# Patient Record
Sex: MALE | Race: BLACK OR AFRICAN AMERICAN | NOT HISPANIC OR LATINO | Employment: FULL TIME | URBAN - METROPOLITAN AREA
[De-identification: names, ages, dates, MRNs, and addresses within clinical notes are randomized per-mention and may not be internally consistent; named-entity substitution may affect disease eponyms.]

---

## 2017-01-16 ENCOUNTER — OFFICE VISIT (OUTPATIENT)
Dept: OTOLARYNGOLOGY | Facility: CLINIC | Age: 27
End: 2017-01-16
Payer: COMMERCIAL

## 2017-01-16 VITALS
DIASTOLIC BLOOD PRESSURE: 80 MMHG | HEART RATE: 65 BPM | WEIGHT: 166.44 LBS | BODY MASS INDEX: 24.58 KG/M2 | TEMPERATURE: 98 F | SYSTOLIC BLOOD PRESSURE: 123 MMHG

## 2017-01-16 DIAGNOSIS — H60.42 CHOLESTEATOMA OF LEFT EXTERNAL AUDITORY CANAL: Primary | ICD-10-CM

## 2017-01-16 PROCEDURE — 1159F MED LIST DOCD IN RCRD: CPT | Mod: S$GLB,,, | Performed by: ORTHOPAEDIC SURGERY

## 2017-01-16 PROCEDURE — 99999 PR PBB SHADOW E&M-NEW PATIENT-LVL III: CPT | Mod: PBBFAC,,, | Performed by: ORTHOPAEDIC SURGERY

## 2017-01-16 PROCEDURE — 99203 OFFICE O/P NEW LOW 30 MIN: CPT | Mod: S$GLB,,, | Performed by: ORTHOPAEDIC SURGERY

## 2017-01-16 RX ORDER — CIPROFLOXACIN AND DEXAMETHASONE 3; 1 MG/ML; MG/ML
4 SUSPENSION/ DROPS AURICULAR (OTIC) 2 TIMES DAILY
Qty: 7.5 ML | Refills: 0 | Status: SHIPPED | OUTPATIENT
Start: 2017-01-16 | End: 2017-01-26

## 2017-01-16 NOTE — MR AVS SNAPSHOT
[facility]  [address] [facility] [address]  Phone: [phone]  Fax: [phone]                  Holger Martin   2017 3:45 PM   Office Visit    Description:  Male : 1990   Provider:  Rach Rain MD   Department:  [facility]           Reason for Visit     Cerumen Impaction           Diagnoses this Visit        Comments    Cholesteatoma of left external auditory canal    -  Primary            To Do List           Future Appointments        Provider Department Dept Phone    2017 10:30 AM AUDIOGRAM, AUDIO [facility] Audiology [phone]    2017 11:15 AM Armando Butler MD [facility] Otorhinolaryngology [phone]      Goals (5 Years of Data)     None      Follow-Up and Disposition     Follow-up and Disposition History       These Medications        Disp Refills Start End    ciprofloxacin-dexamethasone 0.3-0.1% (CIPRODEX) 0.3-0.1 % DrpS 7.5 mL 0 2017    Place 4 drops into both ears 2 (two) times daily. - Both Ears    Pharmacy: [address] Ph #: [phone]         [facility]     [facility] On Call Nurse Care Line -  Assistance  Registered nurses in the [facility] provide clinical advisement, health education, appointment booking, and other advisory services.  Call for this free service at [phone].             Medications           Message regarding Medications     Verify the changes and/or additions to your medication regime listed below are the same as discussed with your clinician today.  If any of these changes or additions are incorrect, please notify your healthcare provider.        START taking these NEW medications        Refills    ciprofloxacin-dexamethasone 0.3-0.1% (CIPRODEX) 0.3-0.1 % DrpS 0    Sig: Place 4 drops into both ears 2 (two) times daily.    Class: Normal    Route: Both Ears      STOP taking these medications      oxycodone-acetaminophen (PERCOCET) 5-325 mg per tablet            Verify that the below list of medications is an accurate representation of the medications you are currently taking.  If none reported, the list may be blank. If incorrect, please contact your healthcare provider. Carry this list with you in case of emergency.           Current Medications     ciprofloxacin-dexamethasone 0.3-0.1% (CIPRODEX) 0.3-0.1 % DrpS Place 4 drops into both ears 2 (two) times daily.           Clinical Reference Information           Vital Signs - Last Recorded  Most recent update: 1/16/2017  4:06 PM by Noa Saunders LPN    BP Pulse Temp Wt BMI    123/80 (BP Location: Left arm, Patient Position: Sitting, BP Method: Automatic) 65 98.4 °F (36.9 °C) (Tympanic) 75.5 kg (166 lb 7.2 oz) 24.58 kg/m2      Blood Pressure          Most Recent Value    BP  123/80      Allergies as of 1/16/2017     No Known Allergies      Immunizations Administered on Date of Encounter - 1/16/2017     None      Orders Placed During Today's Visit      Normal Orders This Visit    Ambulatory consult to ENT       [facility] Sign-Up     Activating your [facility] account is as easy as 1-2-3!     1) Visit [email or web address], select Sign Up Now, enter this activation code and your date of birth, then select Next.  [email or web address]  Expires: 3/2/2017  4:42 PM      2) Create a username and password to use when you visit [facility] in the future and select a security question in case you lose your password and select Next.    3) Enter your e-mail address and click Sign Up!    Additional Information  If you have questions, please e-mail [email or web address] or call [phone] to talk to our [facility] staff. Remember, [facility] is NOT to be used for urgent needs. For medical emergencies, dial 911.         Smoking Cessation     If you would like to quit smoking:   You may be eligible for free services if you are a [place] resident and started smoking cigarettes  before September 1, 1988.  Call the Smoking Cessation Trust (SCT) toll free at (773) 977-1134 or (535) 346-7399.   Call 4-654-QUIT-NOW if you do not meet the above criteria.

## 2017-01-16 NOTE — PROGRESS NOTES
Subjective:       Patient ID: Holger Martin is a 26 y.o. male.    Chief Complaint: Cerumen Impaction (left )    HPI Comments: Patient is a very pleasant 26 year old gentleman here to see me today in followup for evaluation of his left ear.  He has a history of two previous surgeries with Dr. Butler for cholesteatoma in the left ear, most recently in 2012.  He thinks he may have some hearing loss in the left ear, and notes that his headphones are not as loud in that ear.  He has not noted any ear drainage, but says that his ear is wet.  He denies any tinnitus or dizziness.  He has had some mild ear pain over the last month.    Review of Systems   Constitutional: Negative for fatigue, fever and unexpected weight change.   HENT: Positive for ear pain and hearing loss. Negative for congestion, ear discharge, facial swelling, nosebleeds, postnasal drip, rhinorrhea, sinus pressure, sneezing, sore throat, tinnitus, trouble swallowing and voice change.    Eyes: Negative for discharge, redness and itching.   Respiratory: Negative for cough, choking, shortness of breath and wheezing.    Cardiovascular: Negative for chest pain and palpitations.   Gastrointestinal: Negative for abdominal pain.        No reflux.   Musculoskeletal: Negative for neck pain.   Neurological: Negative for dizziness, facial asymmetry, light-headedness and headaches.   Hematological: Negative for adenopathy. Does not bruise/bleed easily.   Psychiatric/Behavioral: Negative for agitation, behavioral problems, confusion and decreased concentration.       Objective:      Physical Exam   Constitutional: He is oriented to person, place, and time. Vital signs are normal. He appears well-developed and well-nourished. No distress.   HENT:   Head: Normocephalic and atraumatic.   Right Ear: Hearing, tympanic membrane, external ear and ear canal normal.   Left Ear: Tympanic membrane, external ear and ear canal normal. Decreased hearing is noted.   Nose: Nose  normal. No mucosal edema, rhinorrhea, nasal deformity or septal deviation.   Mouth/Throat: Uvula is midline, oropharynx is clear and moist and mucous membranes are normal. No trismus in the jaw. Normal dentition. No uvula swelling. No oropharyngeal exudate or posterior oropharyngeal edema.   Mixture of cerumen and keratin debris again filling the left ear canal, tried to remove but the keratin debris is adherent to the canal wall again raising concern for cholesteatoma   Eyes: Conjunctivae and EOM are normal. Pupils are equal, round, and reactive to light. Right eye exhibits no chemosis. Left eye exhibits no chemosis. Right conjunctiva is not injected. Left conjunctiva is not injected. No scleral icterus.   Neck: Trachea normal and phonation normal. No tracheal tenderness present. No tracheal deviation present. No thyroid mass and no thyromegaly present.   Cardiovascular: Intact distal pulses.    Pulmonary/Chest: Effort normal. No accessory muscle usage or stridor. No respiratory distress.   Lymphadenopathy:        Head (right side): No submental, no submandibular, no preauricular and no posterior auricular adenopathy present.        Head (left side): No submental, no submandibular, no preauricular and no posterior auricular adenopathy present.     He has no cervical adenopathy.        Right cervical: No superficial cervical and no deep cervical adenopathy present.       Left cervical: No superficial cervical and no deep cervical adenopathy present.   Neurological: He is alert and oriented to person, place, and time. No cranial nerve deficit.   Skin: Skin is warm and dry. No rash noted. No erythema.   Psychiatric: He has a normal mood and affect. His behavior is normal. Thought content normal.       Assessment:       1. Cholesteatoma of left external auditory canal        Plan:       1. Cholesteatoma of left ear canal:  He has a history of a left EAC cholesteatoma that has previously been treated by Dr. Butler, and  I am concerned that it has returned.  He has not been seen in nearly five years, and we did discuss the importance of routine followup.  Will start on Ciprodex, and schedule him to see Dr. Mcclain in the near future.  Will defer audiology until after left ear is cleared of cerumen/possible cholesteatoma.

## 2017-02-17 ENCOUNTER — OFFICE VISIT (OUTPATIENT)
Dept: OTOLARYNGOLOGY | Facility: CLINIC | Age: 27
End: 2017-02-17
Payer: COMMERCIAL

## 2017-02-17 ENCOUNTER — TELEPHONE (OUTPATIENT)
Dept: OTOLARYNGOLOGY | Facility: CLINIC | Age: 27
End: 2017-02-17

## 2017-02-17 ENCOUNTER — CLINICAL SUPPORT (OUTPATIENT)
Dept: AUDIOLOGY | Facility: CLINIC | Age: 27
End: 2017-02-17
Payer: COMMERCIAL

## 2017-02-17 VITALS — WEIGHT: 167.56 LBS | BODY MASS INDEX: 24.82 KG/M2 | HEIGHT: 69 IN

## 2017-02-17 DIAGNOSIS — H71.92 CHOLESTEATOMA OF LEFT EAR: Primary | ICD-10-CM

## 2017-02-17 DIAGNOSIS — H90.12 CONDUCTIVE HEARING LOSS OF LEFT EAR WITH UNRESTRICTED HEARING OF CONTRALATERAL EAR: Primary | ICD-10-CM

## 2017-02-17 PROCEDURE — 99213 OFFICE O/P EST LOW 20 MIN: CPT | Mod: 25,S$GLB,, | Performed by: OTOLARYNGOLOGY

## 2017-02-17 PROCEDURE — 92557 COMPREHENSIVE HEARING TEST: CPT | Mod: S$GLB,,, | Performed by: AUDIOLOGIST

## 2017-02-17 PROCEDURE — 69220 CLEAN OUT MASTOID CAVITY: CPT | Mod: S$GLB,,, | Performed by: OTOLARYNGOLOGY

## 2017-02-17 PROCEDURE — 99999 PR PBB SHADOW E&M-EST. PATIENT-LVL I: CPT | Mod: PBBFAC,,,

## 2017-02-17 PROCEDURE — 99999 PR PBB SHADOW E&M-EST. PATIENT-LVL III: CPT | Mod: PBBFAC,,, | Performed by: OTOLARYNGOLOGY

## 2017-02-17 RX ORDER — CIPROFLOXACIN AND DEXAMETHASONE 3; 1 MG/ML; MG/ML
4 SUSPENSION/ DROPS AURICULAR (OTIC) 2 TIMES DAILY
Status: ON HOLD | COMMUNITY
End: 2017-03-07 | Stop reason: HOSPADM

## 2017-02-17 NOTE — PROGRESS NOTES
Subjective:       Patient ID: Holger Martin is a 26 y.o. male.    Chief Complaint: Cholesteatoma    HPI: Ref Dr Rain.    Hx of L EAC Chol.    Done 2012.    Lost to F/U.    Now with recurrence.    Past Medical History: Patient has a past medical history of Cholesteatoma and Hearing loss.    Past Surgical History: Patient has a past surgical history that includes Cholesteatoma excision.    Social History: Patient reports that he has been smoking.  He does not have any smokeless tobacco history on file. He reports that he does not drink alcohol or use illicit drugs.    Family History: family history includes Diabetes in his father; Hypertension in his father.    Medications:   Current Outpatient Prescriptions   Medication Sig    ciprofloxacin-dexamethasone 0.3-0.1% (CIPRODEX) 0.3-0.1 % DrpS 4 drops 2 (two) times daily.     No current facility-administered medications for this visit.        Allergies: Patient has No Known Allergies.              Review of Systems   Constitutional: Negative.    HENT: Positive for ear discharge, ear pain and hearing loss. Negative for tinnitus.    Neurological: Negative for dizziness, seizures, syncope, facial asymmetry, speech difficulty, weakness, light-headedness and headaches.       Objective:      Physical Exam   Constitutional: He appears well-developed and well-nourished. No distress.   HENT:   Head: Normocephalic and atraumatic.   Right Ear: No lacerations. No drainage, swelling or tenderness. No foreign bodies. No mastoid tenderness. Tympanic membrane is not injected, not scarred, not perforated, not erythematous, not retracted and not bulging. Tympanic membrane mobility is normal. No middle ear effusion. No hemotympanum. No decreased hearing is noted.   Left Ear: No lacerations. There is drainage. No swelling or tenderness. No foreign bodies. No mastoid tenderness. Tympanic membrane is not injected, not scarred, not perforated, not erythematous, not retracted and not  bulging. Tympanic membrane mobility is normal.  No middle ear effusion. No hemotympanum. Decreased hearing is noted.   Ears:    Mouth/Throat: Oropharynx is clear and moist. No oropharyngeal exudate.       Procedure note:    The patient was brought to the minor procedure room and placed in the supine position. The video microscope was brought onto the field. The ear canal, tympanic membrane and other structures were visualized and demonstrated to the patient and family. The patient tolerated the procedure well and there were no complications.    The patient was brought to the minor procedure room and the left cavity was cleaned of squamous and cerumenous debris with micro dissection techniques using the operating microscope. No evidence of recurrent or residual cholesteatoma. Patient tolerated procedure well.    EAC filled with squamous debris.Penetrates to ME over [place].       Eyes: Pupils are equal, round, and reactive to light. Right eye exhibits normal extraocular motion and no nystagmus. Left eye exhibits normal extraocular motion and no nystagmus.   Neck: Normal range of motion.   Neurological: He is alert. He has normal strength. He displays no atrophy and no tremor. No cranial nerve deficit or sensory deficit. He exhibits normal muscle tone. He displays a negative Romberg sign. He displays no seizure activity. Coordination and gait normal.   Skin: He is not diaphoretic.               Assessment:       1. Cholesteatoma of left ear        Plan:         Discussed Left Tympanomastoidectomy with patient. Intact canal wall vs. Canal wall down depending on the findings at surgery. Discussed possibility of long term tube placement, need for cavity, meatoplasty, cavity care, long term follow up, need for secondary procedures. Risks, complications, alternatives and goals reviewed including possible infection, bleeding, hearing loss, chronic drainage, facial nerve problems, dural injury and other potential problems.      Holger L was seen today for cholesteatoma.    Diagnoses and all orders for this visit:    Cholesteatoma of left ear  -     Ambulatory referral to Audiology  -     CT Temporal Bone without contrast; Future

## 2017-02-17 NOTE — PROGRESS NOTES
Subjective:       Patient ID: Holger Martin is a 26 y.o. male.    Chief Complaint: Cholesteatoma    HPI  Review of Systems    Objective:      Physical Exam    Assessment:       1. Cholesteatoma of left ear        Plan:

## 2017-02-24 ENCOUNTER — HOSPITAL ENCOUNTER (OUTPATIENT)
Dept: RADIOLOGY | Facility: HOSPITAL | Age: 27
Discharge: HOME | End: 2017-02-24
Attending: OTOLARYNGOLOGY
Payer: COMMERCIAL

## 2017-02-24 DIAGNOSIS — H71.92 CHOLESTEATOMA OF LEFT EAR: ICD-10-CM

## 2017-02-24 PROCEDURE — 70480 CT ORBIT/EAR/FOSSA W/O DYE: CPT | Mod: TC

## 2017-03-06 ENCOUNTER — TELEPHONE (OUTPATIENT)
Dept: OTOLARYNGOLOGY | Facility: CLINIC | Age: 27
End: 2017-03-06

## 2017-03-07 ENCOUNTER — HOSPITAL ENCOUNTER (OUTPATIENT)
Facility: HOSPITAL | Age: 27
Discharge: HOME | End: 2017-03-07
Attending: OTOLARYNGOLOGY | Admitting: OTOLARYNGOLOGY
Payer: COMMERCIAL

## 2017-03-07 ENCOUNTER — ANESTHESIA (OUTPATIENT)
Dept: SURGERY | Facility: HOSPITAL | Age: 27
End: 2017-03-07
Payer: COMMERCIAL

## 2017-03-07 ENCOUNTER — ANESTHESIA EVENT (OUTPATIENT)
Dept: SURGERY | Facility: HOSPITAL | Age: 27
End: 2017-03-07
Payer: COMMERCIAL

## 2017-03-07 ENCOUNTER — SURGERY (OUTPATIENT)
Age: 27
End: 2017-03-07

## 2017-03-07 VITALS
WEIGHT: 165 LBS | RESPIRATION RATE: 14 BRPM | SYSTOLIC BLOOD PRESSURE: 124 MMHG | TEMPERATURE: 98 F | HEIGHT: 69 IN | OXYGEN SATURATION: 99 % | HEART RATE: 67 BPM | DIASTOLIC BLOOD PRESSURE: 62 MMHG | BODY MASS INDEX: 24.44 KG/M2

## 2017-03-07 DIAGNOSIS — H71.91 CHOLESTEATOMA, RIGHT: Primary | ICD-10-CM

## 2017-03-07 PROBLEM — H71.90 CHOLESTEATOMA: Status: ACTIVE | Noted: 2017-03-07

## 2017-03-07 PROCEDURE — 27000221 HC OXYGEN, UP TO 24 HOURS

## 2017-03-07 PROCEDURE — 71000033 HC RECOVERY, INTIAL HOUR: Performed by: OTOLARYNGOLOGY

## 2017-03-07 PROCEDURE — 25000003 PHARM REV CODE 250: Performed by: OTOLARYNGOLOGY

## 2017-03-07 PROCEDURE — 27201423 OPTIME MED/SURG SUP & DEVICES STERILE SUPPLY: Performed by: OTOLARYNGOLOGY

## 2017-03-07 PROCEDURE — 71000015 HC POSTOP RECOV 1ST HR: Performed by: OTOLARYNGOLOGY

## 2017-03-07 PROCEDURE — D9220A PRA ANESTHESIA: Mod: ,,, | Performed by: ANESTHESIOLOGY

## 2017-03-07 PROCEDURE — 36000708 HC OR TIME LEV III 1ST 15 MIN: Performed by: OTOLARYNGOLOGY

## 2017-03-07 PROCEDURE — 37000009 HC ANESTHESIA EA ADD 15 MINS: Performed by: OTOLARYNGOLOGY

## 2017-03-07 PROCEDURE — 37000008 HC ANESTHESIA 1ST 15 MINUTES: Performed by: OTOLARYNGOLOGY

## 2017-03-07 PROCEDURE — 36000709 HC OR TIME LEV III EA ADD 15 MIN: Performed by: OTOLARYNGOLOGY

## 2017-03-07 PROCEDURE — 63600175 PHARM REV CODE 636 W HCPCS: Performed by: OTOLARYNGOLOGY

## 2017-03-07 PROCEDURE — 71000039 HC RECOVERY, EACH ADD'L HOUR: Performed by: OTOLARYNGOLOGY

## 2017-03-07 PROCEDURE — 25000003 PHARM REV CODE 250: Performed by: REGISTERED NURSE

## 2017-03-07 PROCEDURE — 69645 REVISE MIDDLE EAR & MASTOID: CPT | Mod: ,,, | Performed by: OTOLARYNGOLOGY

## 2017-03-07 PROCEDURE — 63600175 PHARM REV CODE 636 W HCPCS: Performed by: REGISTERED NURSE

## 2017-03-07 RX ORDER — FENTANYL CITRATE 50 UG/ML
INJECTION, SOLUTION INTRAMUSCULAR; INTRAVENOUS
Status: DISCONTINUED | OUTPATIENT
Start: 2017-03-07 | End: 2017-03-07

## 2017-03-07 RX ORDER — DEXAMETHASONE SODIUM PHOSPHATE 4 MG/ML
12 INJECTION, SOLUTION INTRA-ARTICULAR; INTRALESIONAL; INTRAMUSCULAR; INTRAVENOUS; SOFT TISSUE ONCE
Status: COMPLETED | OUTPATIENT
Start: 2017-03-07 | End: 2017-03-07

## 2017-03-07 RX ORDER — LIDOCAINE HYDROCHLORIDE AND EPINEPHRINE 10; 10 MG/ML; UG/ML
INJECTION, SOLUTION INFILTRATION; PERINEURAL
Status: DISCONTINUED | OUTPATIENT
Start: 2017-03-07 | End: 2017-03-07 | Stop reason: HOSPADM

## 2017-03-07 RX ORDER — GLYCOPYRROLATE 0.2 MG/ML
INJECTION INTRAMUSCULAR; INTRAVENOUS
Status: DISCONTINUED | OUTPATIENT
Start: 2017-03-07 | End: 2017-03-07

## 2017-03-07 RX ORDER — SUCCINYLCHOLINE CHLORIDE 20 MG/ML
INJECTION INTRAMUSCULAR; INTRAVENOUS
Status: DISCONTINUED | OUTPATIENT
Start: 2017-03-07 | End: 2017-03-07

## 2017-03-07 RX ORDER — OXYCODONE AND ACETAMINOPHEN 5; 325 MG/1; MG/1
1 TABLET ORAL EVERY 4 HOURS PRN
Qty: 40 TABLET | Refills: 0 | Status: SHIPPED | OUTPATIENT
Start: 2017-03-07 | End: 2017-06-05

## 2017-03-07 RX ORDER — MIDAZOLAM HYDROCHLORIDE 1 MG/ML
INJECTION, SOLUTION INTRAMUSCULAR; INTRAVENOUS
Status: DISCONTINUED | OUTPATIENT
Start: 2017-03-07 | End: 2017-03-07

## 2017-03-07 RX ORDER — MECLIZINE HCL 12.5 MG 12.5 MG/1
12.5 TABLET ORAL 3 TIMES DAILY PRN
Status: DISCONTINUED | OUTPATIENT
Start: 2017-03-07 | End: 2017-03-07 | Stop reason: HOSPADM

## 2017-03-07 RX ORDER — NEOMYCIN SULFATE, POLYMYXIN B SULFATE AND HYDROCORTISONE 10; 3.5; 1 MG/ML; MG/ML; [USP'U]/ML
SUSPENSION/ DROPS AURICULAR (OTIC)
Status: DISCONTINUED | OUTPATIENT
Start: 2017-03-07 | End: 2017-03-07 | Stop reason: HOSPADM

## 2017-03-07 RX ORDER — AMOXICILLIN 500 MG/1
500 TABLET, FILM COATED ORAL EVERY 12 HOURS
Qty: 20 TABLET | Refills: 0 | Status: SHIPPED | OUTPATIENT
Start: 2017-03-07 | End: 2017-03-17

## 2017-03-07 RX ORDER — OXYCODONE AND ACETAMINOPHEN 5; 325 MG/1; MG/1
1 TABLET ORAL EVERY 4 HOURS PRN
Status: DISCONTINUED | OUTPATIENT
Start: 2017-03-07 | End: 2017-03-07 | Stop reason: HOSPADM

## 2017-03-07 RX ORDER — ROCURONIUM BROMIDE 10 MG/ML
INJECTION, SOLUTION INTRAVENOUS
Status: DISCONTINUED | OUTPATIENT
Start: 2017-03-07 | End: 2017-03-07

## 2017-03-07 RX ORDER — MECLIZINE HCL 12.5 MG 12.5 MG/1
12.5 TABLET ORAL 3 TIMES DAILY PRN
Qty: 20 TABLET | Refills: 0 | Status: SHIPPED | OUTPATIENT
Start: 2017-03-07 | End: 2017-06-05

## 2017-03-07 RX ORDER — SODIUM CHLORIDE 9 MG/ML
INJECTION, SOLUTION INTRAVENOUS CONTINUOUS PRN
Status: DISCONTINUED | OUTPATIENT
Start: 2017-03-07 | End: 2017-03-07

## 2017-03-07 RX ORDER — HYDROMORPHONE HYDROCHLORIDE 2 MG/ML
INJECTION, SOLUTION INTRAMUSCULAR; INTRAVENOUS; SUBCUTANEOUS
Status: DISCONTINUED | OUTPATIENT
Start: 2017-03-07 | End: 2017-03-07

## 2017-03-07 RX ORDER — PHENYLEPHRINE HYDROCHLORIDE 10 MG/ML
INJECTION INTRAVENOUS
Status: DISCONTINUED | OUTPATIENT
Start: 2017-03-07 | End: 2017-03-07

## 2017-03-07 RX ORDER — PROPOFOL 10 MG/ML
VIAL (ML) INTRAVENOUS
Status: DISCONTINUED | OUTPATIENT
Start: 2017-03-07 | End: 2017-03-07

## 2017-03-07 RX ORDER — LIDOCAINE HYDROCHLORIDE 10 MG/ML
INJECTION, SOLUTION INTRAVENOUS
Status: DISCONTINUED | OUTPATIENT
Start: 2017-03-07 | End: 2017-03-07

## 2017-03-07 RX ORDER — ACETAMINOPHEN 10 MG/ML
INJECTION, SOLUTION INTRAVENOUS
Status: DISCONTINUED | OUTPATIENT
Start: 2017-03-07 | End: 2017-03-07

## 2017-03-07 RX ADMIN — HYDROMORPHONE HYDROCHLORIDE 0.5 MG: 2 INJECTION, SOLUTION INTRAMUSCULAR; INTRAVENOUS; SUBCUTANEOUS at 02:03

## 2017-03-07 RX ADMIN — NEOMYCIN SULFATE, POLYMYXIN B SULFATE AND HYDROCORTISONE 4 DROP: 10; 3.5; 1 SUSPENSION/ DROPS AURICULAR (OTIC) at 03:03

## 2017-03-07 RX ADMIN — ROCURONIUM BROMIDE 10 MG: 10 INJECTION, SOLUTION INTRAVENOUS at 01:03

## 2017-03-07 RX ADMIN — PROPOFOL 200 MG: 10 INJECTION, EMULSION INTRAVENOUS at 01:03

## 2017-03-07 RX ADMIN — HYDROMORPHONE HYDROCHLORIDE 1 MG: 2 INJECTION, SOLUTION INTRAMUSCULAR; INTRAVENOUS; SUBCUTANEOUS at 02:03

## 2017-03-07 RX ADMIN — OXYCODONE HYDROCHLORIDE AND ACETAMINOPHEN 1 TABLET: 5; 325 TABLET ORAL at 07:03

## 2017-03-07 RX ADMIN — SODIUM CHLORIDE: 0.9 INJECTION, SOLUTION INTRAVENOUS at 01:03

## 2017-03-07 RX ADMIN — GLYCOPYRROLATE 0.2 MCG: 0.2 INJECTION, SOLUTION INTRAMUSCULAR; INTRAVENOUS at 01:03

## 2017-03-07 RX ADMIN — LIDOCAINE HYDROCHLORIDE AND EPINEPHRINE 1 ML: 10; 10 INJECTION, SOLUTION INFILTRATION; PERINEURAL at 03:03

## 2017-03-07 RX ADMIN — Medication 2 G: at 02:03

## 2017-03-07 RX ADMIN — LIDOCAINE HYDROCHLORIDE 100 MG: 10 INJECTION, SOLUTION INTRAVENOUS at 01:03

## 2017-03-07 RX ADMIN — ACETAMINOPHEN 1000 MG: 10 INJECTION, SOLUTION INTRAVENOUS at 02:03

## 2017-03-07 RX ADMIN — HYDROMORPHONE HYDROCHLORIDE 0.5 MG: 2 INJECTION, SOLUTION INTRAMUSCULAR; INTRAVENOUS; SUBCUTANEOUS at 03:03

## 2017-03-07 RX ADMIN — Medication 3 EACH: at 03:03

## 2017-03-07 RX ADMIN — SUCCINYLCHOLINE CHLORIDE 120 MG: 20 INJECTION, SOLUTION INTRAMUSCULAR; INTRAVENOUS at 01:03

## 2017-03-07 RX ADMIN — DEXAMETHASONE SODIUM PHOSPHATE 12 MG: 4 INJECTION, SOLUTION INTRAMUSCULAR; INTRAVENOUS at 02:03

## 2017-03-07 RX ADMIN — PHENYLEPHRINE HYDROCHLORIDE 200 MCG: 10 INJECTION INTRAVENOUS at 02:03

## 2017-03-07 RX ADMIN — FENTANYL CITRATE 100 MCG: 50 INJECTION, SOLUTION INTRAMUSCULAR; INTRAVENOUS at 02:03

## 2017-03-07 RX ADMIN — FENTANYL CITRATE 50 MCG: 50 INJECTION, SOLUTION INTRAMUSCULAR; INTRAVENOUS at 01:03

## 2017-03-07 RX ADMIN — MIDAZOLAM HYDROCHLORIDE 2 MG: 1 INJECTION, SOLUTION INTRAMUSCULAR; INTRAVENOUS at 01:03

## 2017-03-07 RX ADMIN — FENTANYL CITRATE 100 MCG: 50 INJECTION, SOLUTION INTRAMUSCULAR; INTRAVENOUS at 01:03

## 2017-03-07 RX ADMIN — SODIUM CHLORIDE, SODIUM GLUCONATE, SODIUM ACETATE, POTASSIUM CHLORIDE, MAGNESIUM CHLORIDE, SODIUM PHOSPHATE, DIBASIC, AND POTASSIUM PHOSPHATE: .53; .5; .37; .037; .03; .012; .00082 INJECTION, SOLUTION INTRAVENOUS at 02:03

## 2017-03-07 NOTE — INTERVAL H&P NOTE
The patient has been examined and the H&P has been reviewed:    I concur with the findings and no changes have occurred since H&P was written. No changes in health since last visit. NKDA. No meds this AM. NPO since 2300. Consents signed.    Anesthesia/Surgery risks, benefits and alternative options discussed and understood by patient/family.          Active Hospital Problems    Diagnosis  POA    Cholesteatoma [H71.90]  Yes      Resolved Hospital Problems    Diagnosis Date Resolved POA   No resolved problems to display.

## 2017-03-07 NOTE — IP AVS SNAPSHOT
[facility]  [address]  [facility] [address]  Phone: [phone]           Patient Discharge Instructions     Our goal is to set you up for success. This packet includes information on your condition, medications, and your home care. It will help you to care for yourself so you don't get sicker and need to go back to the hospital.     Please ask your nurse if you have any questions.        There are many details to remember when preparing to leave the hospital. Here is what you will need to do:    1. Take your medicine. If you are prescribed medications, review your Medication List in the following pages. You may have new medications to  at the pharmacy and others that you'll need to stop taking. Review the instructions for how and when to take your medications. Talk with your doctor or nurses if you are unsure of what to do.     2. Go to your follow-up appointments. Specific follow-up information is listed in the following pages. Your may be contacted by a transition nurse or clinical provider about future appointments. Be sure we have all of the phone numbers to reach you, if needed. Please contact your provider's office if you are unable to make an appointment.     3. Watch for warning signs. Your doctor or nurse will give you detailed warning signs to watch for and when to call for assistance. These instructions may also include educational information about your condition. If you experience any of warning signs to your health, call your doctor.               [facility]  Unless otherwise directed by your provider, please contact [facility], our nurse care line that is available for 24/7 assistance.     [phone] (toll-free)    Registered nurses in the [facility] provide clinical advisement, health education, appointment booking, and other advisory services.                    ** Verify the list of medication(s) below is accurate and up  to date. Carry this with you in case of emergency. If your medications have changed, please notify your healthcare provider.             Medication List      START taking these medications        Additional Info                      amoxicillin 500 MG Tab   Commonly known as:  AMOXIL   Quantity:  20 tablet   Refills:  0   Dose:  500 mg    Instructions:  Take 1 tablet (500 mg total) by mouth every 12 (twelve) hours.     Begin Date    AM    Noon    PM    Bedtime       meclizine 12.5 mg tablet   Commonly known as:  ANTIVERT   Quantity:  20 tablet   Refills:  0   Dose:  12.5 mg    Instructions:  Take 1 tablet (12.5 mg total) by mouth 3 (three) times daily as needed.     Begin Date    AM    Noon    PM    Bedtime       oxycodone-acetaminophen 5-325 mg per tablet   Commonly known as:  PERCOCET   Quantity:  40 tablet   Refills:  0   Dose:  1 tablet    Last time this was given:  1 tablet on 3/7/2017  7:09 PM   Instructions:  Take 1 tablet by mouth every 4 (four) hours as needed.     Begin Date    AM    Noon    PM    Bedtime         STOP taking these medications     ciprofloxacin-dexamethasone 0.3-0.1% 0.3-0.1 % Drps   Commonly known as:  CIPRODEX            Where to Get Your Medications      You can get these medications from any pharmacy     Bring a paper prescription for each of these medications     amoxicillin 500 MG Tab    meclizine 12.5 mg tablet    oxycodone-acetaminophen 5-325 mg per tablet                  Please bring to all follow up appointments:    1. A copy of your discharge instructions.  2. All medicines you are currently taking in their original bottles.  3. Identification and insurance card.    Please arrive 15 minutes ahead of scheduled appointment time.    Please call 24 hours in advance if you must reschedule your appointment and/or time.        Your Scheduled Appointments     Mar 08, 2017 10:00 AM CST   Post OP with MD Roberto Carlos [facility] - Otorhinolaryngology ([address] )    [address]  [facility] [address] [phone]              Follow-up Information     Follow up with Armando Butler MD In 1 day.    Specialty:  Otolaryngology    Contact information:    [address]  [facility] [id number] [phone]          Discharge Instructions     Future Orders    Call MD for:     Comments:    Fever >101, inability to take PO, difficulty breathing, intractable dizziness    Diet general     Comments:    Advance as tolerated to home diet    Questions:    Total calories:      Fat restriction, if any:      Protein restriction, if any:      Na restriction, if any:      Fluid restriction:      Additional restrictions:      Leave dressing on - Keep it clean, dry, and intact until clinic visit     Other restrictions (specify):     Comments:    No heavy lifting, strenuous activity, or bending from the waist until cleared by Dr. Butler.  No nose blowing or picking, whenever possible sneeze with mouth open.        Discharge Instructions       ACTIVITY LEVEL:  If you received sedation or an anesthetic, you may feel sleepy for several hours. Rest until you are more awake. Gradually  resume your normal activities. SPECIAL INSTRUCTIONS: Minor degrees of dizziness may be present on head motion and  need not concern you unless it should increase.    DIET:  At home, continue with liquids, and if there is no nausea, you may eat a soft diet. Gradually resume your normal diet.    MEDICATIONS:  You will receive instructions for any pain and/or antibiotic prescriptions. Pain medication should be taken only if needed and  as directed. Antibiotics should be taken as directed until the entire prescription is completed. If ordered, use ear drops as  Directed.    BATHING:  Do not get your ear wet until advised by your doctor. Until such time, when showering or washing the hair, cotton covered  with Vaseline may be placed in the outer ear opening.    PRECAUTIONS:  1) Do not blow your nose until such time  "as your doctor has indicated that your ear is healed. If you must sneeze, please  try to remember to sneeze with the mouth open.  2) Do not pop our ears by holding your nose and blowing air through the Eustachian tube into the ear.  3) Do not have dental work requiring drilling of the teeth until three weeks after surgery.  4) You may anticipate a certain amount of pulsation, popping, clicking, and other sounds in the ear and also feeling of  fullness. Occasional sharp, shooting pains are not unusual. At times, it may feel as if there is liquid in the ear.  5) Consult your doctor before you travel by air.    DRESSING:  External dressings, if used, may be removed the morning after surgery. Bloody, watery discharge may occur during healing.  The outer ear cotton may be changed if necessary. Expect hearing to be slightly worse temporarily, due to packing or tissue  swelling. Improvement is very gradual.    WHEN TO CALL THE DOCTOR:   Any obvious bleeding   Fever over 101°F (38.4°C)   Persistent pain not relieved by pain medication   Purulent (pus drainage from ear.   Worsening dizziness or vertigo after stapedectomy.      Primary Diagnosis     Your primary diagnosis was:  Benign Skin Growth Of Ear      Admission Information     Date & Time Provider Department CSN    3/7/2017 10:24 AM Armando Butler MD [facility] [id number]      Care Providers     Provider Role Specialty Primary office phone    Armando Butler MD Attending Provider Otolaryngology [phone]    Armando Butler MD Surgeon  Otolaryngology [phone]      Your Vitals Were     BP Pulse Temp Resp Height Weight    127/66 66 97.9 °F (36.6 °C) (Tympanic) 12 5' 9" (1.753 m) 74.8 kg (165 lb)    SpO2 BMI             83% 24.37 kg/m2         Recent Lab Values     No lab values to display.      Allergies as of 3/7/2017     No Known Allergies      Advance Directives     An advance directive is a document which, in the event you are " no longer able to make decisions for yourself, tells your healthcare team what kind of treatment you do or do not want to receive, or who you would like to make those decisions for you.  If you do not currently have an advance directive, [facility] encourages you to create one.  For more information call:  [phone]), [phone] ([phone]),  or log on to [email or web address].        Smoking Cessation     If you would like to quit smoking:   You may be eligible for free services if you are a [place] resident and started smoking cigarettes before September 1, 1988.  Call the Smoking Cessation Trust (SCT) toll free at (586) 224-8222 or (053) 063-2562.   Call 4-729-QUIT-NOW if you do not meet the above criteria.            Language Assistance Services     ATTENTION: Language assistance services are available, free of charge. Please call [phone].      ATENCIÓN: Si habla jas, tiene a alonso disposición servicios gratuitos de asistencia lingüística. Llame al [phone].     CHÚ Ý: N?u b?n nói Ti?ng Vi?t, có các d?ch v? h? tr? ngôn ng? mi?n phí dành cho b?n. G?i s? [phone].        [facility] Sign-Up     Activating your [facility] account is as easy as 1-2-3!     1) Visit [email or web address], select Sign Up Now, enter this activation code and your date of birth, then select Next.  [email or web address]  Expires: 4/21/2017  7:14 PM      2) Create a username and password to use when you visit [facility] in the future and select a security question in case you lose your password and select Next.    3) Enter your e-mail address and click Sign Up!    Additional Information  If you have questions, please e-mail [email or web address] or call [phone] to talk to our [facility] staff. Remember, [facility] is NOT to be used for urgent needs. For medical emergencies, dial 911.          [facility] complies with applicable Federal civil rights laws and does not discriminate on the  basis of race, color, national origin, age, disability, or sex.

## 2017-03-07 NOTE — ANESTHESIA PREPROCEDURE EVALUATION
03/07/2017  Holger Martin is a 27 y.o., male.    [facility] Anesthesia Evaluation    I have reviewed the Patient Summary Reports.    I have reviewed the Nursing Notes.   I have reviewed the Medications.     Review of Systems      Physical Exam  General:  Well nourished    Airway/Jaw/Neck:  Airway Findings: Mouth Opening: Normal Tongue: Normal  General Airway Assessment: Good  Mallampati: II  Improves to I with phonation.  TM Distance: Normal, at least 6 cm  Jaw/Neck Findings:  Neck ROM: Normal ROM            Mental Status:  Mental Status Findings:  Alert and Oriented     There is no problem list on file for this patient.        Anesthesia Plan  Type of Anesthesia, risks & benefits discussed:  Anesthesia Type:  general  Patient's Preference:   Intra-op Monitoring Plan:   Intra-op Monitoring Plan Comments:   Post Op Pain Control Plan:   Post Op Pain Control Plan Comments:   Induction:   IV  Beta Blocker:  Patient is not currently on a Beta-Blocker (No further documentation required).       Informed Consent: Patient understands risks and agrees with Anesthesia plan.  Questions answered. Anesthesia consent signed with patient.  ASA Score: 1     Day of Surgery Review of History & Physical:    H&P update referred to the surgeon.         Ready For Surgery From Anesthesia Perspective.

## 2017-03-07 NOTE — BRIEF OP NOTE
[facility]  Brief Operative Note     SUMMARY     Surgery Date: 3/7/2017     Surgeon(s) and Role:     * Armando Butler MD - Primary     * Zehra La MD - Resident - Assisting    Pre-op Diagnosis:  Cholesteatoma of left ear [H71.92]    Post-op Diagnosis:  Post-Op Diagnosis Codes:     * Cholesteatoma of left ear [H71.92]    Procedure(s) (LRB):  TYMPANOMASTOIDECTOMY (Left)    Anesthesia: General    Description of the findings of the procedure: Please see formal dictation    Findings/Key Components: Please see formal dictation    Estimated Blood Loss: 100 mL         Specimens:   Specimen     None          Discharge Note    SUMMARY     Admit Date: 3/7/2017    Discharge Date and Time:  03/07/2017 4:43 PM    Brief Hospital Course: The patient tolerated his procedure well without complication and recovered in the PACU until he met post-anesthesia criteria for discharge to home. He was tolerating a diet and his pain was controlled with PO medications.    Final Diagnosis: Post-Op Diagnosis Codes:     * Cholesteatoma of left ear [H71.92]    Disposition: Home or Self Care    Medications:  Reconciled Home Medications:   Current Discharge Medication List      START taking these medications    Details   amoxicillin (AMOXIL) 500 MG Tab Take 1 tablet (500 mg total) by mouth every 12 (twelve) hours.  Qty: 20 tablet, Refills: 0      meclizine (ANTIVERT) 12.5 mg tablet Take 1 tablet (12.5 mg total) by mouth 3 (three) times daily as needed.  Qty: 20 tablet, Refills: 0      oxycodone-acetaminophen (PERCOCET) 5-325 mg per tablet Take 1 tablet by mouth every 4 (four) hours as needed.  Qty: 40 tablet, Refills: 0         STOP taking these medications       ciprofloxacin-dexamethasone 0.3-0.1% (CIPRODEX) 0.3-0.1 % DrpS Comments:   Reason for Stopping:               Discharge Procedure Orders  Diet general   Order Comments: Advance as tolerated to home diet     Other restrictions (specify):   Order Comments: No  heavy lifting, strenuous activity, or bending from the waist until cleared by Dr. Butler.  No nose blowing or picking, whenever possible sneeze with mouth open.     Call MD for:   Order Comments: Fever >101, inability to take PO, difficulty breathing, intractable dizziness     Leave dressing on - Keep it clean, dry, and intact until clinic visit       Follow-up Information     Follow up with Armando Butler MD In 1 day.    Specialty:  Otolaryngology    Contact information:    [facility] [address]  [facility] [id number] [phone]

## 2017-03-07 NOTE — TRANSFER OF CARE
"Anesthesia Transfer of Care Note    Patient: Holger Martin    Procedure(s) Performed: Procedure(s) (LRB):  TYMPANOMASTOIDECTOMY (Left)    Patient location: PACU    Anesthesia Type: general    Transport from OR: Transported from OR on 6-10 L/min O2 by face mask with adequate spontaneous ventilation    Post pain: adequate analgesia    Post assessment: no apparent anesthetic complications    Post vital signs: stable    Level of consciousness: sedated    Nausea/Vomiting: no nausea/vomiting    Complications: none          Last vitals:   Visit Vitals    /67 (BP Location: Left arm, Patient Position: Lying, BP Method: Automatic)    Pulse 61    Temp 36.6 °C (97.8 °F) (Oral)    Resp 16    Ht 5' 9" (1.753 m)    Wt 74.8 kg (165 lb)    SpO2 100%    BMI 24.37 kg/m2     "

## 2017-03-08 ENCOUNTER — OFFICE VISIT (OUTPATIENT)
Dept: OTOLARYNGOLOGY | Facility: CLINIC | Age: 27
End: 2017-03-08
Payer: COMMERCIAL

## 2017-03-08 VITALS — BODY MASS INDEX: 24.78 KG/M2 | HEIGHT: 69 IN | WEIGHT: 167.31 LBS

## 2017-03-08 DIAGNOSIS — Z09 FOLLOW-UP EXAMINATION AFTER EAR SURGERY: Primary | ICD-10-CM

## 2017-03-08 PROCEDURE — 99999 PR PBB SHADOW E&M-EST. PATIENT-LVL II: CPT | Mod: PBBFAC,,, | Performed by: OTOLARYNGOLOGY

## 2017-03-08 PROCEDURE — 99024 POSTOP FOLLOW-UP VISIT: CPT | Mod: S$GLB,,, | Performed by: OTOLARYNGOLOGY

## 2017-03-08 NOTE — ANESTHESIA RELEASE NOTE
"Anesthesia Release from PACU Note    Patient: Holger Martin    Procedure(s) Performed: Procedure(s) (LRB):  TYMPANOMASTOIDECTOMY (Left)    Anesthesia type: general    Post pain: Adequate analgesia    Post assessment: no apparent anesthetic complications, tolerated procedure well and no evidence of recall    Last Vitals:   Visit Vitals    /69    Pulse 75    Temp 37.4 °C (99.3 °F) (Axillary)    Resp 11    Ht 5' 9" (1.753 m)    Wt 74.8 kg (165 lb)    SpO2 96%    BMI 24.37 kg/m2       Post vital signs: stable    Level of consciousness: awake, alert  and oriented    Nausea/Vomiting: no nausea/no vomiting    Complications: none    Airway Patency: patent    Respiratory: unassisted, spontaneous ventilation, room air    Cardiovascular: stable and blood pressure at baseline    Hydration: euvolemic  "

## 2017-03-08 NOTE — ANESTHESIA POSTPROCEDURE EVALUATION
"Anesthesia Post Evaluation    Patient: Holger Martin    Procedure(s) Performed: Procedure(s) (LRB):  TYMPANOMASTOIDECTOMY (Left)    Final Anesthesia Type: general  Patient location during evaluation: PACU  Patient participation: Yes- Able to Participate  Level of consciousness: awake and alert  Post-procedure vital signs: reviewed and stable  Pain management: adequate  Airway patency: patent  PONV status at discharge: No PONV  Anesthetic complications: no      Cardiovascular status: blood pressure returned to baseline and hemodynamically stable  Respiratory status: unassisted, spontaneous ventilation and room air  Hydration status: euvolemic  Follow-up not needed.        Visit Vitals    /69    Pulse 75    Temp 37.4 °C (99.3 °F) (Axillary)    Resp 11    Ht 5' 9" (1.753 m)    Wt 74.8 kg (165 lb)    SpO2 96%    BMI 24.37 kg/m2       Pain/Meredith Score: Pain Assessment Performed: Yes (3/7/2017 12:07 PM)  Presence of Pain: complains of pain/discomfort (3/7/2017  6:15 PM)  Meredith Score: 10 (3/7/2017  7:03 PM)      "

## 2017-03-08 NOTE — PLAN OF CARE
Problem: Patient Care Overview  Goal: Plan of Care Review  Outcome: Outcome(s) achieved Date Met:  03/07/17  Discharge instructions given and explained to patient and family with verbalization of understanding all instructions. Prescription given and explained next time and doses of each medication. Patients v/s stable, denies n/v and tolerating po, rates pain level tolerable, IV removed, and family at bedside for patient discharge home. Anesthesia and surgical consent in pt's chart at time of discharge.

## 2017-03-08 NOTE — PROGRESS NOTES
1 day S/P L Rev CWD TM for ext EAC Chol.    Pt doing well post op.  No unusual pain or drainage. No significant vertigo or nausea.    Dressing removed. No evidence of hematoma or seroma. Steristrips intact.  Facial nerve function normal. Packing dry and intact. Routine re check in one week with appropriate water precautions.

## 2017-03-08 NOTE — DISCHARGE INSTRUCTIONS
ACTIVITY LEVEL:  If you received sedation or an anesthetic, you may feel sleepy for several hours. Rest until you are more awake. Gradually  resume your normal activities. SPECIAL INSTRUCTIONS: Minor degrees of dizziness may be present on head motion and  need not concern you unless it should increase.    DIET:  At home, continue with liquids, and if there is no nausea, you may eat a soft diet. Gradually resume your normal diet.    MEDICATIONS:  You will receive instructions for any pain and/or antibiotic prescriptions. Pain medication should be taken only if needed and  as directed. Antibiotics should be taken as directed until the entire prescription is completed. If ordered, use ear drops as  Directed.    BATHING:  Do not get your ear wet until advised by your doctor. Until such time, when showering or washing the hair, cotton covered  with Vaseline may be placed in the outer ear opening.    PRECAUTIONS:  1) Do not blow your nose until such time as your doctor has indicated that your ear is healed. If you must sneeze, please  try to remember to sneeze with the mouth open.  2) Do not pop our ears by holding your nose and blowing air through the Eustachian tube into the ear.  3) Do not have dental work requiring drilling of the teeth until three weeks after surgery.  4) You may anticipate a certain amount of pulsation, popping, clicking, and other sounds in the ear and also feeling of  fullness. Occasional sharp, shooting pains are not unusual. At times, it may feel as if there is liquid in the ear.  5) Consult your doctor before you travel by air.    DRESSING:  External dressings, if used, may be removed the morning after surgery. Bloody, watery discharge may occur during healing.  The outer ear cotton may be changed if necessary. Expect hearing to be slightly worse temporarily, due to packing or tissue  swelling. Improvement is very gradual.    WHEN TO CALL THE DOCTOR:   Any obvious bleeding   Fever over  101°F (38.4°C)   Persistent pain not relieved by pain medication   Purulent (pus drainage from ear.   Worsening dizziness or vertigo after stapedectomy.

## 2017-03-08 NOTE — OP NOTE
DATE OF PROCEDURE:  03/07/2017.    PREOPERATIVE DIAGNOSIS:  Extensive left ear canal, middle ear and mastoid   cholesteatoma.    POSTOPERATIVE DIAGNOSIS:  Extensive left ear canal, middle ear and mastoid   cholesteatoma.    OPERATIVE PROCEDURE:  Left revision canal wall down mastoidectomy with type 3   tympanoplasty, use of operating microscope and facial nerve monitor.    SURGEON:  Armando Butler M.D.    ASSISTANT:  Dr. Reyes.    ANESTHESIA:  General endotracheal.    OPERATIVE PROCEDURE IN DETAIL:  The patient was brought to the Operating Room   and placed in supine position.  After general endotracheal anesthesia was   induced, the left ear was prepped and draped in usual fashion for transcanal and   postauricular surgery.  Transcanal and postauricular injection with 1%   Xylocaine with epinephrine was given and the operating microscope was then   brought on to the field and used for the remainder of the case.  The ear canal   was visualized and the patient's extensive cholesteatoma in the ear canal was   debrided.  This revealed that the cholesteatoma had penetrated the tympanic   membrane and had extensively invaded the middle ear and mastoid area.  With this   in mind, a postauricular incision was made and carried down to the level of   temporalis fascia.  Temporalis fascia was harvested for later grafting and   T-shaped musculoperiosteal incision was made at the lateral mastoid cortex.  The   mastoid periosteum was then elevated off the lateral mastoid cortex and the   patient's previously created mastoid cavity was noted.  It was filled with   extensive inflammatory debris.  The ear canal was then entered from behind and   retractors were placed.  The ear canal skin was incised laterally and removed   from the bony anterior ear canal.  After inspecting everything, it was decided   to proceed with a canal wall down procedure.  This was done by removing the   patient's previously created posterior canal wall  with a high-speed drill.  The   scutum was resected.  The anterior canal wall was straightened.  The floor was   lowered.  The facial ridge was lowered to the level of the fallopian canal   between the second genu and the stylomastoid foramen.  There was a cholesteatoma   in the area of the attic and antrum and this was removed with microdissection   techniques.  Over the lateral semicircular canal, the incus and the malleus were   extensively eroded and they were removed, leaving intact a mobile stapes arch   with absence of the capitulum.  The cholesteatoma was dissected out of the area   of the sinus tympani and down the eustachian tube.  This was done with   microdissection techniques and it was felt a complete removal was obtained.    Extensive scalloping of the margins of the cavity was then done to create a   roughly box shaped scalloped cavity.  Final hemostasis and irrigation was   carried out.  A postauricular incision was made subcutaneously to remove conchal   cartilage for the meatoplasty as well as grafting.  Meatal cartilage was then   trimmed to size to fit a sculpted dome graft to go on top of the mobile stapes   capitulum and this was put in place and stabilized with saline-soaked Gelfoam.    The middle ear was filled with saline-soaked Gelfoam and the previously   harvested temporalis graft was placed over this in a lateral graft fashion.  The   anterior canal wall skin and the anterior angle was then replaced and the   entire cavity was packed with dry and Cortisporin-impregnated Gelfoam.  Final   meatoplasty incisions were made with superior and posterior relaxing incisions,   which were tacked open with multiple 3-0 interrupted Vicryls.  The meatus was   then packed with Cortisporin-impregnated Gelfoam and the wound was then closed   in layers with 3-0 interrupted Vicryl with Steri-Strips on the skin.  A sterile   pressure dressing was applied.  The patient tolerated the procedure well.  The    estimated blood loss was about 6 mL.      AGNIESZKA/OMAR  dd: 03/08/2017 08:36:57 (CST)  td: 03/08/2017 12:03:27 (CST)  Doc ID   #[id number]  Job ID #[id number]    CC:

## 2017-03-15 ENCOUNTER — OFFICE VISIT (OUTPATIENT)
Dept: OTOLARYNGOLOGY | Facility: CLINIC | Age: 27
End: 2017-03-15
Payer: COMMERCIAL

## 2017-03-15 VITALS — HEIGHT: 69 IN | WEIGHT: 162.06 LBS | BODY MASS INDEX: 24 KG/M2

## 2017-03-15 DIAGNOSIS — Z09 FOLLOW-UP EXAMINATION AFTER EAR SURGERY: Primary | ICD-10-CM

## 2017-03-15 PROCEDURE — 99024 POSTOP FOLLOW-UP VISIT: CPT | Mod: S$GLB,,, | Performed by: OTOLARYNGOLOGY

## 2017-03-15 PROCEDURE — 99999 PR PBB SHADOW E&M-EST. PATIENT-LVL II: CPT | Mod: PBBFAC,,, | Performed by: OTOLARYNGOLOGY

## 2017-03-15 RX ORDER — NEOMYCIN SULFATE, POLYMYXIN B SULFATE, HYDROCORTISONE 3.5; 10000; 1 MG/ML; [USP'U]/ML; MG/ML
4 SOLUTION/ DROPS AURICULAR (OTIC) 3 TIMES DAILY
Qty: 10 ML | Refills: 5 | Status: SHIPPED | OUTPATIENT
Start: 2017-03-15 | End: 2017-04-05

## 2017-03-15 NOTE — PROGRESS NOTES
1 wk S/P L CWD TM.    Pt doing well post op.  No unusual pain or drainage. No significant vertigo or nausea.    Steri strips removed. No evidence of hematoma or seroma. No evidence of infection. Packing is dry and intact. Start ototopical drops and re check in three weeks.

## 2017-04-05 ENCOUNTER — OFFICE VISIT (OUTPATIENT)
Dept: OTOLARYNGOLOGY | Facility: CLINIC | Age: 27
End: 2017-04-05
Payer: COMMERCIAL

## 2017-04-05 VITALS — HEIGHT: 69 IN | BODY MASS INDEX: 24.95 KG/M2 | WEIGHT: 168.44 LBS

## 2017-04-05 DIAGNOSIS — Z09 FOLLOW-UP EXAMINATION AFTER EAR SURGERY: Primary | ICD-10-CM

## 2017-04-05 PROCEDURE — 99024 POSTOP FOLLOW-UP VISIT: CPT | Mod: S$GLB,,, | Performed by: OTOLARYNGOLOGY

## 2017-04-05 PROCEDURE — 99999 PR PBB SHADOW E&M-EST. PATIENT-LVL II: CPT | Mod: PBBFAC,,, | Performed by: OTOLARYNGOLOGY

## 2017-04-05 NOTE — PROGRESS NOTES
1 mo S/P L CWD TM.    Pt doing well post op.  No unusual pain or drainage. No significant vertigo or nausea.    Post auricular incision healing well.    Packing vacuumed out of ear with microscope.    Graft intact and healing well.Gent V used    Patient tolerated well.    RTC 4 weeks.    Continue ear drops as directed.

## 2017-05-03 ENCOUNTER — OFFICE VISIT (OUTPATIENT)
Dept: OTOLARYNGOLOGY | Facility: CLINIC | Age: 27
End: 2017-05-03
Payer: COMMERCIAL

## 2017-05-03 VITALS — WEIGHT: 166.25 LBS | BODY MASS INDEX: 24.62 KG/M2 | HEIGHT: 69 IN

## 2017-05-03 DIAGNOSIS — Z09 FOLLOW-UP EXAMINATION AFTER EAR SURGERY: Primary | ICD-10-CM

## 2017-05-03 PROCEDURE — 99024 POSTOP FOLLOW-UP VISIT: CPT | Mod: S$GLB,,, | Performed by: OTOLARYNGOLOGY

## 2017-05-03 PROCEDURE — 99999 PR PBB SHADOW E&M-EST. PATIENT-LVL II: CPT | Mod: PBBFAC,,, | Performed by: OTOLARYNGOLOGY

## 2017-05-03 RX ORDER — NEOMYCIN SULFATE, POLYMYXIN B SULFATE, HYDROCORTISONE 3.5; 10000; 1 MG/ML; [USP'U]/ML; MG/ML
SOLUTION/ DROPS AURICULAR (OTIC)
COMMUNITY
Start: 2017-04-26 | End: 2017-09-22

## 2017-05-03 NOTE — PROGRESS NOTES
2 mo S/P L CWD TM.    Pt doing well post op.  No unusual pain or drainage. No significant vertigo or nausea.    Post auricular incision healing well.    L Cav healing.    AgNO3 used.    Patient tolerated well.     Cont. Drops qd    RTC  4 weeks. ? Audio then.

## 2017-06-05 ENCOUNTER — CLINICAL SUPPORT (OUTPATIENT)
Dept: AUDIOLOGY | Facility: CLINIC | Age: 27
End: 2017-06-05
Payer: COMMERCIAL

## 2017-06-05 ENCOUNTER — OFFICE VISIT (OUTPATIENT)
Dept: OTOLARYNGOLOGY | Facility: CLINIC | Age: 27
End: 2017-06-05
Payer: COMMERCIAL

## 2017-06-05 VITALS — BODY MASS INDEX: 24.59 KG/M2 | WEIGHT: 166 LBS | HEIGHT: 69 IN

## 2017-06-05 DIAGNOSIS — Z09 FOLLOW-UP EXAMINATION AFTER EAR SURGERY: Primary | ICD-10-CM

## 2017-06-05 DIAGNOSIS — H90.12 CONDUCTIVE HEARING LOSS OF LEFT EAR WITH UNRESTRICTED HEARING OF RIGHT EAR: Primary | ICD-10-CM

## 2017-06-05 PROCEDURE — 92557 COMPREHENSIVE HEARING TEST: CPT | Mod: 52,S$GLB,, | Performed by: AUDIOLOGIST

## 2017-06-05 PROCEDURE — 99999 PR PBB SHADOW E&M-EST. PATIENT-LVL I: CPT | Mod: PBBFAC,,,

## 2017-06-05 PROCEDURE — 99024 POSTOP FOLLOW-UP VISIT: CPT | Mod: S$GLB,,, | Performed by: OTOLARYNGOLOGY

## 2017-06-05 PROCEDURE — 99999 PR PBB SHADOW E&M-EST. PATIENT-LVL III: CPT | Mod: PBBFAC,,, | Performed by: OTOLARYNGOLOGY

## 2017-06-05 NOTE — PROGRESS NOTES
3 mos S/P L CWD TM.    Pt doing well post op.  No unusual pain or drainage. No significant vertigo or nausea.    Exam: L PA healed.    Cav healed well.    cav healing/ AgNO3 used on surface.    Audio: up to 30 SRT.    P RTC 2 mos.

## 2017-09-22 ENCOUNTER — OFFICE VISIT (OUTPATIENT)
Dept: OTOLARYNGOLOGY | Facility: CLINIC | Age: 27
End: 2017-09-22
Payer: COMMERCIAL

## 2017-09-22 VITALS — HEIGHT: 69 IN | BODY MASS INDEX: 24.88 KG/M2 | WEIGHT: 168 LBS

## 2017-09-22 DIAGNOSIS — H71.92 CHOLESTEATOMA OF LEFT EAR: ICD-10-CM

## 2017-09-22 DIAGNOSIS — Z09 FOLLOW-UP EXAMINATION AFTER EAR SURGERY: Primary | ICD-10-CM

## 2017-09-22 PROCEDURE — 99999 PR PBB SHADOW E&M-EST. PATIENT-LVL II: CPT | Mod: PBBFAC,,, | Performed by: OTOLARYNGOLOGY

## 2017-09-22 PROCEDURE — 69220 CLEAN OUT MASTOID CAVITY: CPT | Mod: LT,S$GLB,, | Performed by: STUDENT IN AN ORGANIZED HEALTH CARE EDUCATION/TRAINING PROGRAM

## 2017-09-22 PROCEDURE — 99499 UNLISTED E&M SERVICE: CPT | Mod: S$GLB,,, | Performed by: OTOLARYNGOLOGY

## 2017-09-22 NOTE — PROGRESS NOTES
6 mos S/P L CWD TM.    Pt doing well post op.  No unusual pain or drainage. No significant vertigo or nausea.    Exam: L PA healed. Cavity cleaned under microscope, dry and thickened debris. Healing well.     P: RTC 3-4 mos

## 2018-08-15 ENCOUNTER — CLINICAL SUPPORT (OUTPATIENT)
Dept: AUDIOLOGY | Facility: CLINIC | Age: 28
End: 2018-08-15
Payer: COMMERCIAL

## 2018-08-15 ENCOUNTER — OFFICE VISIT (OUTPATIENT)
Dept: OTOLARYNGOLOGY | Facility: CLINIC | Age: 28
End: 2018-08-15
Payer: COMMERCIAL

## 2018-08-15 VITALS — HEIGHT: 69 IN | BODY MASS INDEX: 23.8 KG/M2 | WEIGHT: 160.69 LBS

## 2018-08-15 DIAGNOSIS — H71.92 CHOLESTEATOMA OF LEFT EAR: Primary | ICD-10-CM

## 2018-08-15 DIAGNOSIS — H90.12 CONDUCTIVE HEARING LOSS OF LEFT EAR WITH UNRESTRICTED HEARING OF RIGHT EAR: Primary | ICD-10-CM

## 2018-08-15 PROCEDURE — 92557 COMPREHENSIVE HEARING TEST: CPT | Mod: 52,S$GLB,, | Performed by: AUDIOLOGIST-HEARING AID FITTER

## 2018-08-15 PROCEDURE — 69220 CLEAN OUT MASTOID CAVITY: CPT | Mod: LT,S$GLB,,

## 2018-08-15 PROCEDURE — 99999 PR PBB SHADOW E&M-EST. PATIENT-LVL II: CPT | Mod: PBBFAC,,, | Performed by: OTOLARYNGOLOGY

## 2018-08-15 PROCEDURE — 99499 UNLISTED E&M SERVICE: CPT | Mod: S$GLB,,, | Performed by: OTOLARYNGOLOGY

## 2018-08-15 NOTE — PROGRESS NOTES
S/P L CWD TM 3/7/17    Pt doing well post op.  No unusual pain or drainage. No significant vertigo or nausea.    Exam: L PA healed. Cavity cleaned under microscope, dry debris removed. Healing well. Small EAC cholesteatoma removed. Medial scar band lysed            P: RTC 6 mos

## 2018-08-15 NOTE — PROGRESS NOTES
Holger Mario was seen in the clinic today for a left ear follow-up hearing evaluation.       Audiological testing revealed a mild conductive hearing loss. A speech reception threshold was obtained at 25 dBHL. Speech recognition was 96%.    Recommendations:  1. Otologic evaluation  2. Follow-up hearing evaluation as needed

## 2019-12-26 ENCOUNTER — TELEPHONE (OUTPATIENT)
Dept: OTOLARYNGOLOGY | Facility: CLINIC | Age: 29
End: 2019-12-26

## 2019-12-26 NOTE — TELEPHONE ENCOUNTER
Spoke with patient who requested appointment , availability will be 17th of January  Routine cleaning  Ear check

## 2020-01-17 ENCOUNTER — OFFICE VISIT (OUTPATIENT)
Dept: OTOLARYNGOLOGY | Facility: CLINIC | Age: 30
End: 2020-01-17
Payer: COMMERCIAL

## 2020-01-17 ENCOUNTER — TELEPHONE (OUTPATIENT)
Dept: OTOLARYNGOLOGY | Facility: CLINIC | Age: 30
End: 2020-01-17

## 2020-01-17 VITALS — HEIGHT: 69 IN | WEIGHT: 188.25 LBS | BODY MASS INDEX: 27.88 KG/M2

## 2020-01-17 DIAGNOSIS — H71.90 CHOLESTEATOMA, UNSPECIFIED LATERALITY: Primary | ICD-10-CM

## 2020-01-17 DIAGNOSIS — H71.92 CHOLESTEATOMA OF LEFT EAR: Primary | ICD-10-CM

## 2020-01-17 PROCEDURE — 99999 PR PBB SHADOW E&M-EST. PATIENT-LVL II: ICD-10-PCS | Mod: PBBFAC,,, | Performed by: OTOLARYNGOLOGY

## 2020-01-17 PROCEDURE — 99499 NO LOS: ICD-10-PCS | Mod: S$GLB,,, | Performed by: OTOLARYNGOLOGY

## 2020-01-17 PROCEDURE — 69220 PR DEBRIDE, MASTOID CAVITY, SIMPLE: ICD-10-PCS | Mod: LT,S$GLB,, | Performed by: OTOLARYNGOLOGY

## 2020-01-17 PROCEDURE — 99999 PR PBB SHADOW E&M-EST. PATIENT-LVL II: CPT | Mod: PBBFAC,,, | Performed by: OTOLARYNGOLOGY

## 2020-01-17 PROCEDURE — 69220 CLEAN OUT MASTOID CAVITY: CPT | Mod: LT,S$GLB,, | Performed by: OTOLARYNGOLOGY

## 2020-01-17 PROCEDURE — 99499 UNLISTED E&M SERVICE: CPT | Mod: S$GLB,,, | Performed by: OTOLARYNGOLOGY

## 2020-01-17 NOTE — PROGRESS NOTES
Has L Cav.  Last visit 18 mos ago.    Hearing OK.    Exam: L cav well healed.    Some debris ant.    The patient was brought to the minor procedure room and the left cavity was cleaned of squamous and cerumenous debris with micro dissection techniques using the operating microscope. No evidence of recurrent or residual cholesteatoma. Patient tolerated procedure well.    P: RTC 1 yr.

## 2023-09-07 ENCOUNTER — OFFICE VISIT (OUTPATIENT)
Dept: OTOLARYNGOLOGY | Facility: CLINIC | Age: 33
End: 2023-09-07
Payer: COMMERCIAL

## 2023-09-07 DIAGNOSIS — H71.92 CHOLESTEATOMA OF LEFT EAR: Primary | ICD-10-CM

## 2023-09-07 PROCEDURE — 99999 PR PBB SHADOW E&M-NEW PATIENT-LVL II: CPT | Mod: PBBFAC,,, | Performed by: OTOLARYNGOLOGY

## 2023-09-07 PROCEDURE — 69220 CLEAN OUT MASTOID CAVITY: CPT | Mod: LT,S$GLB,, | Performed by: OTOLARYNGOLOGY

## 2023-09-07 PROCEDURE — 99212 PR OFFICE/OUTPT VISIT, EST, LEVL II, 10-19 MIN: ICD-10-PCS | Mod: 25,S$GLB,, | Performed by: OTOLARYNGOLOGY

## 2023-09-07 PROCEDURE — 1159F MED LIST DOCD IN RCRD: CPT | Mod: CPTII,S$GLB,, | Performed by: OTOLARYNGOLOGY

## 2023-09-07 PROCEDURE — 99212 OFFICE O/P EST SF 10 MIN: CPT | Mod: 25,S$GLB,, | Performed by: OTOLARYNGOLOGY

## 2023-09-07 PROCEDURE — 1159F PR MEDICATION LIST DOCUMENTED IN MEDICAL RECORD: ICD-10-PCS | Mod: CPTII,S$GLB,, | Performed by: OTOLARYNGOLOGY

## 2023-09-07 PROCEDURE — 99999 PR PBB SHADOW E&M-NEW PATIENT-LVL II: ICD-10-PCS | Mod: PBBFAC,,, | Performed by: OTOLARYNGOLOGY

## 2023-09-07 PROCEDURE — 69220 PR DEBRIDE, MASTOID CAVITY, SIMPLE: ICD-10-PCS | Mod: LT,S$GLB,, | Performed by: OTOLARYNGOLOGY

## 2023-09-07 NOTE — PROGRESS NOTES
Otology/Neurotology History and Physical     CC: Mastoid bowl cleaning    HPI:  Holger Martin is a 33 y.o. male of CWD mastoidectomy AS in 2017. Has not been seen in clinic since 2020. Patient reports frequent crusting but no other real issues. Denies any otorrhea or hearing changes      Past Medical History  He has a past medical history of Cholesteatoma and Hearing loss.    Past Surgical History  He has a past surgical history that includes Cholesteatoma excision and orthopedic surgery (Left).    Family History  His family history includes Diabetes in his father; Hypertension in his father.    Social History  He reports that he has been smoking. He does not have any smokeless tobacco history on file. He reports that he does not drink alcohol and does not use drugs.    Allergies  He has No Known Allergies.    Medications  He currently has no medications in their medication list.    Review of Systems       Objective:     There were no vitals taken for this visit.   Physical Exam  Constitutional: Well appearing/communicating. Voice clear. NAD.  Eyes: EOM I Bilaterally  Head/Face: Normocephalic.  House Brackmann I Bilaterally.  Right Ear: See microscopy  Left Ear: See microscopy  Nose: No gross nasal septal deviation. Inferior Turbinates 2+ bilaterally. No septal perforation. No masses/lesions. External nasal skin without masses/lesions.  Oral Cavity: Gingiva/lips WNL. Oral Tongue mobile. Hard Palate WNL.   Oropharynx: Tonsillar fossa/pharyngeal wall without lesions. Posterior oropharynx WNL.  Soft palate without masses. Midline uvula.   Neck/Lymphatic: No LAD I-VI bilaterally.  No thyromegaly.  No masses noted on exam.  Neuro/Psychiatric: AOx3.  Normal mood and affect.   Respiratory: Normal respiratory effort, no stridor/stertor, no retractions noted.      Procedure  Ear Microscopy:    The patient was laid supine in the minor procedure room. A microscope was used to examine the ear. Any cerumen or otorrhea was  removed with suction and instrumentation. Findings below.     AD: EAC wnl. TM intact without UMAIR.   AS: Mastoid cavity with extensive dry crusting covering surface, cleaned thoroughly. No evidence of recurrent disease    The patient was brought to the minor procedure room and the left cavity was cleaned of squamous and cerumenous debris with micro dissection techniques using the operating microscope. No evidence of recurrent or residual cholesteatoma. Patient tolerated procedure well.        Data Reviewed         Assessment:     1. Cholesteatoma of left ear         Plan:   RTC in 1 year for routine cleaning

## 2025-01-02 ENCOUNTER — OFFICE VISIT (OUTPATIENT)
Dept: OTOLARYNGOLOGY | Facility: CLINIC | Age: 35
End: 2025-01-02
Payer: COMMERCIAL

## 2025-01-02 DIAGNOSIS — H71.92 CHOLESTEATOMA OF LEFT EAR: Primary | ICD-10-CM

## 2025-01-02 PROCEDURE — 99999 PR PBB SHADOW E&M-EST. PATIENT-LVL I: CPT | Mod: PBBFAC,,, | Performed by: OTOLARYNGOLOGY

## 2025-01-09 ENCOUNTER — TELEPHONE (OUTPATIENT)
Dept: UROLOGY | Facility: CLINIC | Age: 35
End: 2025-01-09
Payer: COMMERCIAL

## 2025-01-09 NOTE — TELEPHONE ENCOUNTER
Outgoing call placed to patient's wife in regards to scheduling Vas consult. Appointment has been scheduled, no further assistance required at this time.      ----- Message from Hero sent at 1/9/2025  3:25 PM CST -----  Contact: isabelle/wife  Isabelle is requesting a call back in regards to getting Holger an appointment scheduled for vasectomy consultation  Please call him at [phone]

## 2025-02-03 ENCOUNTER — OFFICE VISIT (OUTPATIENT)
Dept: UROLOGY | Facility: CLINIC | Age: 35
End: 2025-02-03
Payer: COMMERCIAL

## 2025-02-03 DIAGNOSIS — Z30.09 VASECTOMY EVALUATION: Primary | ICD-10-CM

## 2025-02-03 PROCEDURE — 1160F RVW MEDS BY RX/DR IN RCRD: CPT | Mod: CPTII,S$GLB,, | Performed by: UROLOGY

## 2025-02-03 PROCEDURE — 1159F MED LIST DOCD IN RCRD: CPT | Mod: CPTII,S$GLB,, | Performed by: UROLOGY

## 2025-02-03 PROCEDURE — 99999 PR PBB SHADOW E&M-EST. PATIENT-LVL II: CPT | Mod: PBBFAC,,, | Performed by: UROLOGY

## 2025-02-03 PROCEDURE — 99204 OFFICE O/P NEW MOD 45 MIN: CPT | Mod: S$GLB,,, | Performed by: UROLOGY

## 2025-02-03 RX ORDER — OXYCODONE AND ACETAMINOPHEN 5; 325 MG/1; MG/1
1 TABLET ORAL EVERY 4 HOURS PRN
Qty: 25 TABLET | Refills: 0 | Status: SHIPPED | OUTPATIENT
Start: 2025-02-03

## 2025-02-03 RX ORDER — SULFAMETHOXAZOLE AND TRIMETHOPRIM 800; 160 MG/1; MG/1
1 TABLET ORAL 2 TIMES DAILY
Qty: 10 TABLET | Refills: 0 | Status: SHIPPED | OUTPATIENT
Start: 2025-02-03

## 2025-02-03 RX ORDER — DIAZEPAM 5 MG/1
5 TABLET ORAL ONCE
Qty: 1 TABLET | Refills: 0 | Status: SHIPPED | OUTPATIENT
Start: 2025-02-03 | End: 2025-02-03

## 2025-02-03 RX ORDER — PROMETHAZINE HYDROCHLORIDE 25 MG/1
25 TABLET ORAL ONCE
Qty: 1 TABLET | Refills: 0 | Status: SHIPPED | OUTPATIENT
Start: 2025-02-03 | End: 2025-02-03

## 2025-02-03 NOTE — PROGRESS NOTES
HPI:    Mr. Martin is a 34 y.o. male who presents for evaluation re: vasectomy. He has 4 children, he is certain that he desires no more. He is aware of other forms of contraception. He is aware that vasectomy is to be considered permanent/irreversible.    PATIENT HISTORY:    Past Medical History:   Diagnosis Date    Cholesteatoma     Hearing loss        Past Surgical History:   Procedure Laterality Date    CHOLESTEATOMA EXCISION      ORTHOPEDIC SURGERY Left     left leg       Family History   Problem Relation Name Age of Onset    Hypertension Father      Diabetes Father         Social History     Socioeconomic History    Marital status:    Tobacco Use    Smoking status: Every Day     Current packs/day: 1.00     Types: Cigarettes   Substance and Sexual Activity    Alcohol use: No    Drug use: No       Medications: Per Epic List    Allergies:  Patient has no known allergies.    Review of Systems:  General: No fever, chills, fatigability, or weight loss.  Skin: No rashes, itching, or changes in color or texture of skin.  Chest: Denies ANDREWS, cyanosis, wheezing, cough, and sputum production.  Abdomen: Appetite fine. No weight loss. Denies diarrhea, abdominal pain, hematemesis, or blood in stool.  Musculoskeletal: No joint stiffness or swelling. Denies back pain.  : As above.  All other review of systems negative.    PHYSICAL EXAM:    General appearance: Well-developed, well-nourished male in no apparent distress.  Mental status: Alert and oriented. Cooperative with normal affect.  Neuro: Motor and sensory exams grossly intact.  HEENT: Normocephalic. Atraumatic.  Neck: Normal ROM.  Chest: Non labored breathing.  Heart: Regular rate and rhythm.  Abdomen: Soft, non-distended, non-tender. No masses or organomegaly. No evidence of inguinal hernia.  Genitourinary: Penis is normal with no evidence of abnormal masses or tenderness. Epididymis, vas deferens, and cord structures are normal bilaterally.  Extremities: No  cyanosis, clubbing, or edema.    IMPRESSION / PLAN:    Holger Martin is a 34 y.o. male who presents for evaluation for a vasectomy.    I discussed with the patient risks and benefits, as well as alternatives. We discussed possible complications at length, including fever, infection, bleeding (possibly requiring re-operation), testicular injury or atrophy with loss of function, chronic pain, persistent or recurrent presence of sperm in the ejaculate, or vasal recanalization, as well as the risks attendant to the anesthetic.  We discussed that he should stop aspirin, ibuprofen, and similar products at least one week prior to the procedure. Acetaminophen is okay to use for headaches, pain, etc.  He should bring an athletic supporter and loose-fitting sweat pants on the day of the procedure.  We discussed that he must continue to use contraception until two consecutive semen analyses (checked at approximately 2-3 months post-vasectomy) reveal azoospermia.    He will schedule an elective vasectomy.

## 2025-04-07 ENCOUNTER — PROCEDURE VISIT (OUTPATIENT)
Dept: UROLOGY | Facility: CLINIC | Age: 35
End: 2025-04-07
Payer: COMMERCIAL

## 2025-04-07 DIAGNOSIS — Z30.09 VASECTOMY EVALUATION: Primary | ICD-10-CM

## 2025-04-07 PROCEDURE — 99499 UNLISTED E&M SERVICE: CPT | Mod: S$GLB,,, | Performed by: UROLOGY

## 2025-04-07 PROCEDURE — 88302 TISSUE EXAM BY PATHOLOGIST: CPT | Mod: 26,,, | Performed by: PATHOLOGY

## 2025-04-07 PROCEDURE — 55250 REMOVAL OF SPERM DUCT(S): CPT | Mod: S$GLB,,, | Performed by: UROLOGY

## 2025-04-07 PROCEDURE — 88302 TISSUE EXAM BY PATHOLOGIST: CPT | Mod: TC,91 | Performed by: UROLOGY

## 2025-04-07 NOTE — PROCEDURES
Procedures  Procedure: Vasectomy    Procedure in Detail: Pt presented, and after proper consents were obtained, his skin prepped and draped in normal sterile fashion in the supine position for vasectomy. The left vas was isolated and 1% lidocaine was instilled in the skin over the vas. The skin was opened sharply, and more local was applied into the vas. Ring forceps were used to isolate the vas and bring it out of the wound. The sheath overlying the vas was dissected away, and a length of vas exposed. Approximately a 1 cm segment of vas was excised, and each end was bovie'd intraluminally and then clipped with sterile metal clips.  Hemostasis was meticulously maintained and we proceeded to the right side.  Local was then applied into the right vas. Ring forceps were used to isolate the vas and bring it out of the wound. The sheath overlying the vas was dissected away, and a length of vas exposed.  Approximately a 1 cm segment of vas was excised, and each end was bovie'd intraluminally and then clipped with sterile metal clips.  Hemostasis was meticulously maintained and we proceeded with closure.  After hemostasis was again assured at the level of the scrotal skin, I closed using a 4-0 Monocryl.  The left and right vas segments were sent to pathology for examination. The patient tolerated the procedure well and was discharged home in stable condition.    Blood loss: scant    Path: as above    Follow Up: Pt given instructions for care at home, and all his questions were answered. RTC 2 months for his first semen analysis. Call or visit sooner if any difficulties arise.

## 2025-04-09 LAB
ESTROGEN SERPL-MCNC: NORMAL PG/ML
INSULIN SERPL-ACNC: NORMAL U[IU]/ML
LAB AP GROSS DESCRIPTION: NORMAL
LAB AP PERFORMING LOCATION(S): NORMAL
LAB AP REPORT FOOTNOTES: NORMAL

## 2025-05-12 ENCOUNTER — TELEPHONE (OUTPATIENT)
Dept: UROLOGY | Facility: CLINIC | Age: 35
End: 2025-05-12
Payer: MEDICAID

## 2025-05-12 ENCOUNTER — CLINICAL SUPPORT (OUTPATIENT)
Dept: UROLOGY | Facility: CLINIC | Age: 35
End: 2025-05-12
Payer: MEDICAID

## 2025-05-12 DIAGNOSIS — Z30.09 VASECTOMY EVALUATION: Primary | ICD-10-CM

## 2025-05-12 NOTE — PROGRESS NOTES
Patient dropped off a semen sample, MD reviewed  and sample was clear. Patient was notified, patient was reminded of newly rescheduled semen sample drop off with provider.

## 2025-05-28 ENCOUNTER — CLINICAL SUPPORT (OUTPATIENT)
Dept: UROLOGY | Facility: CLINIC | Age: 35
End: 2025-05-28
Payer: MEDICAID

## 2025-05-28 DIAGNOSIS — Z30.09 VASECTOMY EVALUATION: Primary | ICD-10-CM

## 2025-07-11 ENCOUNTER — CLINICAL SUPPORT (OUTPATIENT)
Dept: UROLOGY | Facility: CLINIC | Age: 35
End: 2025-07-11
Payer: MEDICAID

## 2025-07-11 DIAGNOSIS — Z30.09 VASECTOMY EVALUATION: Primary | ICD-10-CM

## 2025-07-11 PROCEDURE — 99999 PR PBB SHADOW E&M-EST. PATIENT-LVL I: CPT | Mod: PBBFAC,,,

## 2025-07-11 PROCEDURE — 99211 OFF/OP EST MAY X REQ PHY/QHP: CPT | Mod: PBBFAC

## (undated) DEVICE — BURR CUTT CARB 3X38 E9000 FLUT

## (undated) DEVICE — CLIPPER BLADE MOD 4406 (CAREF)

## (undated) DEVICE — KIT SUCTION IRRIGATION

## (undated) DEVICE — BLADE SCALP OPTHL NDL TIP 3MM

## (undated) DEVICE — SOL IRR NACL .9% 3000ML

## (undated) DEVICE — KIT EAR EAOFE

## (undated) DEVICE — BLADE SURG CARBON STEEL SZ11

## (undated) DEVICE — SEE MEDLINE ITEM 152622

## (undated) DEVICE — SOL IRR WATER STRL 3000 ML

## (undated) DEVICE — SEE MEDLINE ITEM 146373

## (undated) DEVICE — KIT DRESS GLASSCK EAR ADLT ST

## (undated) DEVICE — BLADE SCALP OPHTL RND TIP

## (undated) DEVICE — ELECTRODE REM PLYHSV RETURN 9

## (undated) DEVICE — ELECTRODE NDL

## (undated) DEVICE — SUT BONE WAX 2.5 GRMS 12/BX

## (undated) DEVICE — BLADE OTOLOGY BEAVER 5X84MM

## (undated) DEVICE — SUCTION SURGICAL FRAZR

## (undated) DEVICE — Device

## (undated) DEVICE — BURR LSO ROUND FLUTED 5MM

## (undated) DEVICE — CLOSURE SKIN STERI STRIP 1/2X4

## (undated) DEVICE — SUT 3/0 18IN COATED VICRYLP

## (undated) DEVICE — BURR STEEL ROUND E9000 2X48MM

## (undated) DEVICE — BIT DRILL TUBING

## (undated) DEVICE — SEE MEDLINE ITEM 157128